# Patient Record
Sex: FEMALE | Race: ASIAN | NOT HISPANIC OR LATINO | URBAN - METROPOLITAN AREA
[De-identification: names, ages, dates, MRNs, and addresses within clinical notes are randomized per-mention and may not be internally consistent; named-entity substitution may affect disease eponyms.]

---

## 2022-09-02 ENCOUNTER — EMERGENCY (EMERGENCY)
Facility: HOSPITAL | Age: 47
LOS: 0 days | Discharge: HOME | End: 2022-09-02
Attending: EMERGENCY MEDICINE
Payer: COMMERCIAL

## 2022-09-02 VITALS
DIASTOLIC BLOOD PRESSURE: 87 MMHG | SYSTOLIC BLOOD PRESSURE: 118 MMHG | HEART RATE: 81 BPM | OXYGEN SATURATION: 100 % | RESPIRATION RATE: 18 BRPM

## 2022-09-02 VITALS — WEIGHT: 125 LBS

## 2022-09-02 DIAGNOSIS — N93.9 ABNORMAL UTERINE AND VAGINAL BLEEDING, UNSPECIFIED: ICD-10-CM

## 2022-09-02 DIAGNOSIS — D25.9 LEIOMYOMA OF UTERUS, UNSPECIFIED: ICD-10-CM

## 2022-09-02 DIAGNOSIS — R42 DIZZINESS AND GIDDINESS: ICD-10-CM

## 2022-09-02 DIAGNOSIS — D64.9 ANEMIA, UNSPECIFIED: ICD-10-CM

## 2022-09-02 LAB
ALBUMIN SERPL ELPH-MCNC: 3.5 G/DL — SIGNIFICANT CHANGE UP (ref 3.3–5)
ALP SERPL-CCNC: 58 U/L — SIGNIFICANT CHANGE UP (ref 40–120)
ALT FLD-CCNC: 20 U/L — SIGNIFICANT CHANGE UP (ref 12–78)
ANION GAP SERPL CALC-SCNC: 4 MMOL/L — LOW (ref 5–17)
APPEARANCE UR: ABNORMAL
AST SERPL-CCNC: 21 U/L — SIGNIFICANT CHANGE UP (ref 15–37)
BASOPHILS # BLD AUTO: 0.07 K/UL — SIGNIFICANT CHANGE UP (ref 0–0.2)
BASOPHILS NFR BLD AUTO: 0.7 % — SIGNIFICANT CHANGE UP (ref 0–2)
BILIRUB SERPL-MCNC: 0.2 MG/DL — SIGNIFICANT CHANGE UP (ref 0.2–1.2)
BILIRUB UR-MCNC: NEGATIVE — SIGNIFICANT CHANGE UP
BUN SERPL-MCNC: 12 MG/DL — SIGNIFICANT CHANGE UP (ref 7–23)
CALCIUM SERPL-MCNC: 8.4 MG/DL — LOW (ref 8.5–10.1)
CHLORIDE SERPL-SCNC: 107 MMOL/L — SIGNIFICANT CHANGE UP (ref 96–108)
CO2 SERPL-SCNC: 26 MMOL/L — SIGNIFICANT CHANGE UP (ref 22–31)
COLOR SPEC: YELLOW — SIGNIFICANT CHANGE UP
CREAT SERPL-MCNC: 0.75 MG/DL — SIGNIFICANT CHANGE UP (ref 0.5–1.3)
DIFF PNL FLD: ABNORMAL
EGFR: 98 ML/MIN/1.73M2 — SIGNIFICANT CHANGE UP
EOSINOPHIL # BLD AUTO: 0.09 K/UL — SIGNIFICANT CHANGE UP (ref 0–0.5)
EOSINOPHIL NFR BLD AUTO: 0.8 % — SIGNIFICANT CHANGE UP (ref 0–6)
GLUCOSE SERPL-MCNC: 126 MG/DL — HIGH (ref 70–99)
GLUCOSE UR QL: NEGATIVE — SIGNIFICANT CHANGE UP
HCT VFR BLD CALC: 27.8 % — LOW (ref 34.5–45)
HCT VFR BLD CALC: 27.9 % — LOW (ref 34.5–45)
HGB BLD-MCNC: 9 G/DL — LOW (ref 11.5–15.5)
HGB BLD-MCNC: 9.1 G/DL — LOW (ref 11.5–15.5)
IMM GRANULOCYTES NFR BLD AUTO: 0.5 % — SIGNIFICANT CHANGE UP (ref 0–1.5)
KETONES UR-MCNC: ABNORMAL
LEUKOCYTE ESTERASE UR-ACNC: ABNORMAL
LYMPHOCYTES # BLD AUTO: 1.33 K/UL — SIGNIFICANT CHANGE UP (ref 1–3.3)
LYMPHOCYTES # BLD AUTO: 12.5 % — LOW (ref 13–44)
MCHC RBC-ENTMCNC: 27.9 PG — SIGNIFICANT CHANGE UP (ref 27–34)
MCHC RBC-ENTMCNC: 28.3 PG — SIGNIFICANT CHANGE UP (ref 27–34)
MCHC RBC-ENTMCNC: 32.4 GM/DL — SIGNIFICANT CHANGE UP (ref 32–36)
MCHC RBC-ENTMCNC: 32.6 GM/DL — SIGNIFICANT CHANGE UP (ref 32–36)
MCV RBC AUTO: 86.1 FL — SIGNIFICANT CHANGE UP (ref 80–100)
MCV RBC AUTO: 86.9 FL — SIGNIFICANT CHANGE UP (ref 80–100)
MONOCYTES # BLD AUTO: 0.46 K/UL — SIGNIFICANT CHANGE UP (ref 0–0.9)
MONOCYTES NFR BLD AUTO: 4.3 % — SIGNIFICANT CHANGE UP (ref 2–14)
NEUTROPHILS # BLD AUTO: 8.63 K/UL — HIGH (ref 1.8–7.4)
NEUTROPHILS NFR BLD AUTO: 81.2 % — HIGH (ref 43–77)
NITRITE UR-MCNC: NEGATIVE — SIGNIFICANT CHANGE UP
PH UR: 5 — SIGNIFICANT CHANGE UP (ref 5–8)
PLATELET # BLD AUTO: 234 K/UL — SIGNIFICANT CHANGE UP (ref 150–400)
PLATELET # BLD AUTO: 239 K/UL — SIGNIFICANT CHANGE UP (ref 150–400)
POTASSIUM SERPL-MCNC: 3.9 MMOL/L — SIGNIFICANT CHANGE UP (ref 3.5–5.3)
POTASSIUM SERPL-SCNC: 3.9 MMOL/L — SIGNIFICANT CHANGE UP (ref 3.5–5.3)
PROT SERPL-MCNC: 6.8 GM/DL — SIGNIFICANT CHANGE UP (ref 6–8.3)
PROT UR-MCNC: 15
RBC # BLD: 3.21 M/UL — LOW (ref 3.8–5.2)
RBC # BLD: 3.23 M/UL — LOW (ref 3.8–5.2)
RBC # FLD: 14.3 % — SIGNIFICANT CHANGE UP (ref 10.3–14.5)
RBC # FLD: 14.3 % — SIGNIFICANT CHANGE UP (ref 10.3–14.5)
SODIUM SERPL-SCNC: 137 MMOL/L — SIGNIFICANT CHANGE UP (ref 135–145)
SP GR SPEC: 1.02 — SIGNIFICANT CHANGE UP (ref 1.01–1.02)
UROBILINOGEN FLD QL: 1
WBC # BLD: 10.47 K/UL — SIGNIFICANT CHANGE UP (ref 3.8–10.5)
WBC # BLD: 10.63 K/UL — HIGH (ref 3.8–10.5)
WBC # FLD AUTO: 10.47 K/UL — SIGNIFICANT CHANGE UP (ref 3.8–10.5)
WBC # FLD AUTO: 10.63 K/UL — HIGH (ref 3.8–10.5)

## 2022-09-02 PROCEDURE — 85027 COMPLETE CBC AUTOMATED: CPT

## 2022-09-02 PROCEDURE — 81001 URINALYSIS AUTO W/SCOPE: CPT

## 2022-09-02 PROCEDURE — 76856 US EXAM PELVIC COMPLETE: CPT

## 2022-09-02 PROCEDURE — 99285 EMERGENCY DEPT VISIT HI MDM: CPT

## 2022-09-02 PROCEDURE — 87086 URINE CULTURE/COLONY COUNT: CPT

## 2022-09-02 PROCEDURE — 76856 US EXAM PELVIC COMPLETE: CPT | Mod: 26

## 2022-09-02 PROCEDURE — 36415 COLL VENOUS BLD VENIPUNCTURE: CPT

## 2022-09-02 PROCEDURE — 85025 COMPLETE CBC W/AUTO DIFF WBC: CPT

## 2022-09-02 PROCEDURE — 99284 EMERGENCY DEPT VISIT MOD MDM: CPT | Mod: 25

## 2022-09-02 PROCEDURE — 80053 COMPREHEN METABOLIC PANEL: CPT

## 2022-09-02 NOTE — ED ADULT TRIAGE NOTE - CHIEF COMPLAINT QUOTE
pt presents to ED due to severe vaginal bleeding x 2 days pt states her period was 2 weeks ago and normal cycle complaints of pain and cramping

## 2022-09-02 NOTE — ED STATDOCS - CARE PLAN
Principal Discharge DX:	Abnormal uterine and vaginal bleeding  Secondary Diagnosis:	Fibroid, uterine  Secondary Diagnosis:	Anemia   1

## 2022-09-02 NOTE — ED STATDOCS - NS ED ROS FT
Constitutional: nad, well appearing, +lightheadedness   HEENT:  no nasal congestion, eye drainage or ear pain.    CVS:  no cp  Resp:  No sob, no cough  GI:  no abdominal pain, no nausea or vomiting  :  no dysuria, +vaginal bleeding   MSK: no joint pain or limited ROM  Skin: no rash  Neuro: no change in mental status or level of consciousness  Heme/lymph: no bleeding

## 2022-09-02 NOTE — ED STATDOCS - PATIENT PORTAL LINK FT
You can access the FollowMyHealth Patient Portal offered by [facility] by registering at the following website: [email or web address]. By joining [facility]’s FollowMyHealth portal, you will also be able to view your health information using other applications (apps) compatible with our system.

## 2022-09-02 NOTE — ED STATDOCS - CLINICAL SUMMARY MEDICAL DECISION MAKING FREE TEXT BOX
Will check H&H, US pelvis and reassess. Will check H&H, US pelvis and reassess.  No significant symptoms of anemia.  Bleeding seems to have improved.  Has some level of baseline anemia and is taking iron.  Pt otherwise well appearing.  Dispo pending labs, imaging, and reassessment.

## 2022-09-02 NOTE — ED STATDOCS - NS ED ATTENDING STATEMENT MOD
This was a shared visit with the NESHA. I reviewed and verified the documentation and independently performed the documented:

## 2022-09-02 NOTE — ED STATDOCS - NSFOLLOWUPINSTRUCTIONS_ED_ALL_ED_FT
Uterine Fibroids       Uterine fibroids, also called leiomyomas, are noncancerous (benign) tumors that can grow in the uterus. They can cause heavy menstrual bleeding and pain. Fibroids may also grow in the fallopian tubes, cervix, or tissues (ligaments) near the uterus.    You may have one or many fibroids. Fibroids vary in size, weight, and where they grow in the uterus. Some can become quite large. Most fibroids do not require medical treatment.      What are the causes?    The cause of this condition is not known.      What increases the risk?    You are more likely to develop this condition if you:  •Are in your 30s or 40s and have not gone through menopause.      •Have a family history of this condition.      •Are of  descent.      •Started your menstrual period at age 10 or younger.      •Have never given birth.      •Are overweight or obese.        What are the signs or symptoms?    Many women do not have any symptoms. Symptoms of this condition may include:  •Heavy menstrual bleeding.      •Bleeding between menstrual periods.      •Pain and pressure in the pelvic area, between your hip bones.      •Pain during sex.      •Bladder problems, such as needing to urinate right away or more often than usual.      •Inability to have children (infertility).      •Failure to carry pregnancy to term (miscarriage).        How is this diagnosed?    This condition may be diagnosed based on:  •Your symptoms and medical history.      •A physical exam.      •A pelvic exam that includes feeling for any tumors.      •Imaging tests, such as ultrasound or MRI.        How is this treated?    Treatment for this condition may include follow-up visits with your health care provider to monitor your fibroids for any changes. Other treatment may include:•Medicines, such as:   •Medicines to relieve pain, including aspirin and NSAIDs, such as ibuprofen or naproxen.      •Hormone therapy. Treatment may be given as a pill or an injection, or it may be inserted into the uterus using an intrauterine device (IUD).      •Surgery that would do one of the following:  •Remove the fibroids (myomectomy). This may be recommended if fibroids affect your fertility and you want to become pregnant.      •Remove the uterus (hysterectomy).      •Block the blood supply to the fibroids (uterine artery embolization). This can cause them to shrink and die.          Follow these instructions at home:    Medicines     •Take over-the-counter and prescription medicines only as told by your health care provider.      •Ask your health care provider if you should take iron pills or eat more iron-rich foods, such as dark green, leafy vegetables. Heavy menstrual bleeding can cause low iron levels.        Managing pain      If directed, apply heat to your back or abdomen to reduce pain. Use the heat source that your health care provider recommends, such as a moist heat pack or a heating pad. To apply heat:  •Place a towel between your skin and the heat source.      •Leave the heat on for 20–30 minutes.      •Remove the heat if your skin turns bright red. This is especially important if you are unable to feel pain, heat, or cold. You may have a greater risk of getting burned.      General instructions   •Pay close attention to your menstrual cycle. Tell your health care provider about any changes, such as:  •Heavier bleeding that requires you to change your pads or tampons more than usual.      •A change in the number of days that your menstrual period lasts.      •A change in symptoms that come with your menstrual period, such as back pain or cramps in your abdomen.        •Keep all follow-up visits. This is important, especially if your fibroids need to be monitored for any changes.        Contact a health care provider if you:    •Have pelvic pain, back pain, or cramps in your abdomen that do not get better with medicine or heat.      •Develop new bleeding between menstrual periods.      •Have increased bleeding during or between menstrual periods.      •Feel more tired or weak than usual.      •Feel light-headed.        Get help right away if you:    •Faint.      •Have pelvic pain that suddenly gets worse.      •Have severe vaginal bleeding that soaks a tampon or pad in 30 minutes or less.        Summary    •Uterine fibroids are noncancerous (benign) tumors that can develop in the uterus.      •The exact cause of this condition is not known.      •Most fibroids do not require medical treatment unless they affect your ability to have children (fertility).      •Contact a health care provider if you have pelvic pain, back pain, or cramps in your abdomen that do not get better with medicines.      •Get help right away if you faint, have pelvic pain that suddenly gets worse, or have severe vaginal bleeding.      This information is not intended to replace advice given to you by your health care provider. Make sure you discuss any questions you have with your health care provider.      Abnormal Uterine Bleeding    A female body showing the reproductive organs, with a close-up view of the uterus and vagina.   Abnormal uterine bleeding is unusual bleeding from the uterus. It includes bleeding after sex, or bleeding or spotting between menstrual periods. It may also include bleeding that is heavier than normal, menstrual periods that last longer than usual, or bleeding that occurs after menopause.    Abnormal uterine bleeding can affect teenagers, women in their reproductive years, pregnant women, and women who have reached menopause. Common causes of abnormal uterine bleeding include:  •Pregnancy.      •Abnormal growths within the lining of the uterus (polyps).      •Benign tumors or growths in the uterus (fibroids). These are not cancer.      •Infection.      •Cancer.      •Too much or too little of some hormones in the body (hormonal imbalances).      Any type of abnormal bleeding should be checked by a health care provider. Many cases are minor and simple to treat, but others may be more serious. Treatment will depend on the cause of the bleeding and how severe it is.      Follow these instructions at home:    Medicines     •Take over-the-counter and prescription medicines only as told by your health care provider.    •Ask your health care provider about:  •Taking medicines such as aspirin and ibuprofen. These medicines can thin your blood. Do not take these medicines unless your health care provider tells you to take them.      •Taking over-the-counter medicines, vitamins, herbs, and supplements.        •If you were prescribed iron pills, take them as told by your health care provider. Iron pills help to replace iron that your body loses because of this condition.      Managing constipation     In cases of severe bleeding, you may be asked to increase your iron intake to treat anemia. Doing this may cause constipation. To prevent or treat constipation, you may need to:  •Drink enough fluid to keep your urine pale yellow.      •Take over-the-counter or prescription medicines.      •Eat foods that are high in fiber, such as beans, whole grains, and fresh fruits and vegetables.      •Limit foods that are high in fat and processed sugars, such as fried or sweet foods.      Activity    Alter your activity to decrease bleeding if you need to change your sanitary pad more than one time every 2 hours:  •Lie in bed with your feet raised (elevated).      •Place a cold pack on your lower abdomen.      •Rest as much as possible until the bleeding stops or slows down.      General instructions    •Do not use tampons, douche, or have sex until your health care provider says these things are okay.      •Change your sanitary pads often.      •Get regular exams. These include pelvic exams and cervical cancer screenings.      •It is up to you to get the results of any tests that are done. Ask your health care provider, or the department that is doing the tests, when your results will be ready.    •Monitor your condition for any changes. For 2 months, write down:  •When your menstrual period starts.      •When your menstrual period ends.      •When any abnormal vaginal bleeding occurs.      •What problems you notice.        •Keep all follow-up visits. This is important.        Contact a health care provider if:    •You have bleeding that lasts for more than one week.      •You feel dizzy at times.      •You feel nauseous or you vomit.      •You feel light-headed or weak.      •You notice any other changes that show that your condition is getting worse.        Get help right away if:    •You faint.      •You have bleeding that soaks through a sanitary pad every hour.      •You have pain in the abdomen.      •You have a fever or chills.      •You become sweaty or weak.      •You pass large blood clots from your vagina.      These symptoms may represent a serious problem that is an emergency. Do not wait to see if the symptoms will go away. Get medical help right away. Call your local emergency services (911 in the [place] Do not drive yourself to the hospital.       Summary    •Abnormal uterine bleeding is unusual bleeding from the uterus.      •Any type of abnormal bleeding should be checked by a health care provider. Many cases are minor and simple to treat, but others may be more serious.      •Treatment will depend on the cause of the bleeding and how severe it is.      •Get help right away if you faint, you have bleeding that soaks through a sanitary pad every hour, or you pass large blood clots from your vagina.      This information is not intended to replace advice given to you by your health care provider. Make sure you discuss any questions you have with your health care provider. an appointment was scheduled for you with your GYN Dr. Dawood Osborne on Tuesday 09/06/22 at 3:45pm please attend this appointment, bring your results from todays visit to your appointment      Uterine Fibroids       Uterine fibroids, also called leiomyomas, are noncancerous (benign) tumors that can grow in the uterus. They can cause heavy menstrual bleeding and pain. Fibroids may also grow in the fallopian tubes, cervix, or tissues (ligaments) near the uterus.    You may have one or many fibroids. Fibroids vary in size, weight, and where they grow in the uterus. Some can become quite large. Most fibroids do not require medical treatment.      What are the causes?    The cause of this condition is not known.      What increases the risk?    You are more likely to develop this condition if you:  •Are in your 30s or 40s and have not gone through menopause.      •Have a family history of this condition.      •Are of  descent.      •Started your menstrual period at age 10 or younger.      •Have never given birth.      •Are overweight or obese.        What are the signs or symptoms?    Many women do not have any symptoms. Symptoms of this condition may include:  •Heavy menstrual bleeding.      •Bleeding between menstrual periods.      •Pain and pressure in the pelvic area, between your hip bones.      •Pain during sex.      •Bladder problems, such as needing to urinate right away or more often than usual.      •Inability to have children (infertility).      •Failure to carry pregnancy to term (miscarriage).        How is this diagnosed?    This condition may be diagnosed based on:  •Your symptoms and medical history.      •A physical exam.      •A pelvic exam that includes feeling for any tumors.      •Imaging tests, such as ultrasound or MRI.        How is this treated?    Treatment for this condition may include follow-up visits with your health care provider to monitor your fibroids for any changes. Other treatment may include:•Medicines, such as:   •Medicines to relieve pain, including aspirin and NSAIDs, such as ibuprofen or naproxen.      •Hormone therapy. Treatment may be given as a pill or an injection, or it may be inserted into the uterus using an intrauterine device (IUD).      •Surgery that would do one of the following:  •Remove the fibroids (myomectomy). This may be recommended if fibroids affect your fertility and you want to become pregnant.      •Remove the uterus (hysterectomy).      •Block the blood supply to the fibroids (uterine artery embolization). This can cause them to shrink and die.          Follow these instructions at home:    Medicines     •Take over-the-counter and prescription medicines only as told by your health care provider.      •Ask your health care provider if you should take iron pills or eat more iron-rich foods, such as dark green, leafy vegetables. Heavy menstrual bleeding can cause low iron levels.        Managing pain      If directed, apply heat to your back or abdomen to reduce pain. Use the heat source that your health care provider recommends, such as a moist heat pack or a heating pad. To apply heat:  •Place a towel between your skin and the heat source.      •Leave the heat on for 20–30 minutes.      •Remove the heat if your skin turns bright red. This is especially important if you are unable to feel pain, heat, or cold. You may have a greater risk of getting burned.      General instructions   •Pay close attention to your menstrual cycle. Tell your health care provider about any changes, such as:  •Heavier bleeding that requires you to change your pads or tampons more than usual.      •A change in the number of days that your menstrual period lasts.      •A change in symptoms that come with your menstrual period, such as back pain or cramps in your abdomen.        •Keep all follow-up visits. This is important, especially if your fibroids need to be monitored for any changes.        Contact a health care provider if you:    •Have pelvic pain, back pain, or cramps in your abdomen that do not get better with medicine or heat.      •Develop new bleeding between menstrual periods.      •Have increased bleeding during or between menstrual periods.      •Feel more tired or weak than usual.      •Feel light-headed.        Get help right away if you:    •Faint.      •Have pelvic pain that suddenly gets worse.      •Have severe vaginal bleeding that soaks a tampon or pad in 30 minutes or less.        Summary    •Uterine fibroids are noncancerous (benign) tumors that can develop in the uterus.      •The exact cause of this condition is not known.      •Most fibroids do not require medical treatment unless they affect your ability to have children (fertility).      •Contact a health care provider if you have pelvic pain, back pain, or cramps in your abdomen that do not get better with medicines.      •Get help right away if you faint, have pelvic pain that suddenly gets worse, or have severe vaginal bleeding.      This information is not intended to replace advice given to you by your health care provider. Make sure you discuss any questions you have with your health care provider.      Abnormal Uterine Bleeding    A female body showing the reproductive organs, with a close-up view of the uterus and vagina.   Abnormal uterine bleeding is unusual bleeding from the uterus. It includes bleeding after sex, or bleeding or spotting between menstrual periods. It may also include bleeding that is heavier than normal, menstrual periods that last longer than usual, or bleeding that occurs after menopause.    Abnormal uterine bleeding can affect teenagers, women in their reproductive years, pregnant women, and women who have reached menopause. Common causes of abnormal uterine bleeding include:  •Pregnancy.      •Abnormal growths within the lining of the uterus (polyps).      •Benign tumors or growths in the uterus (fibroids). These are not cancer.      •Infection.      •Cancer.      •Too much or too little of some hormones in the body (hormonal imbalances).      Any type of abnormal bleeding should be checked by a health care provider. Many cases are minor and simple to treat, but others may be more serious. Treatment will depend on the cause of the bleeding and how severe it is.      Follow these instructions at home:    Medicines     •Take over-the-counter and prescription medicines only as told by your health care provider.    •Ask your health care provider about:  •Taking medicines such as aspirin and ibuprofen. These medicines can thin your blood. Do not take these medicines unless your health care provider tells you to take them.      •Taking over-the-counter medicines, vitamins, herbs, and supplements.        •If you were prescribed iron pills, take them as told by your health care provider. Iron pills help to replace iron that your body loses because of this condition.      Managing constipation     In cases of severe bleeding, you may be asked to increase your iron intake to treat anemia. Doing this may cause constipation. To prevent or treat constipation, you may need to:  •Drink enough fluid to keep your urine pale yellow.      •Take over-the-counter or prescription medicines.      •Eat foods that are high in fiber, such as beans, whole grains, and fresh fruits and vegetables.      •Limit foods that are high in fat and processed sugars, such as fried or sweet foods.      Activity    Alter your activity to decrease bleeding if you need to change your sanitary pad more than one time every 2 hours:  •Lie in bed with your feet raised (elevated).      •Place a cold pack on your lower abdomen.      •Rest as much as possible until the bleeding stops or slows down.      General instructions    •Do not use tampons, douche, or have sex until your health care provider says these things are okay.      •Change your sanitary pads often.      •Get regular exams. These include pelvic exams and cervical cancer screenings.      •It is up to you to get the results of any tests that are done. Ask your health care provider, or the department that is doing the tests, when your results will be ready.    •Monitor your condition for any changes. For 2 months, write down:  •When your menstrual period starts.      •When your menstrual period ends.      •When any abnormal vaginal bleeding occurs.      •What problems you notice.        •Keep all follow-up visits. This is important.        Contact a health care provider if:    •You have bleeding that lasts for more than one week.      •You feel dizzy at times.      •You feel nauseous or you vomit.      •You feel light-headed or weak.      •You notice any other changes that show that your condition is getting worse.        Get help right away if:    •You faint.      •You have bleeding that soaks through a sanitary pad every hour.      •You have pain in the abdomen.      •You have a fever or chills.      •You become sweaty or weak.      •You pass large blood clots from your vagina.      These symptoms may represent a serious problem that is an emergency. Do not wait to see if the symptoms will go away. Get medical help right away. Call your local emergency services (911 in the [place] Do not drive yourself to the hospital.       Summary    •Abnormal uterine bleeding is unusual bleeding from the uterus.      •Any type of abnormal bleeding should be checked by a health care provider. Many cases are minor and simple to treat, but others may be more serious.      •Treatment will depend on the cause of the bleeding and how severe it is.      •Get help right away if you faint, you have bleeding that soaks through a sanitary pad every hour, or you pass large blood clots from your vagina.      This information is not intended to replace advice given to you by your health care provider. Make sure you discuss any questions you have with your health care provider.    Anemia    WHAT YOU NEED TO KNOW:    Anemia is a low number of red blood cells or a low amount of hemoglobin in your red blood cells. Hemoglobin is a protein that helps carry oxygen throughout your body. Red blood cells use iron to create hemoglobin. Anemia may develop if your body does not have enough iron. It may also develop if your body does not make enough red blood cells or they die faster than your body can make them.     DISCHARGE INSTRUCTIONS:    Call 911 or have someone call 911 for any of the following:     You lose consciousness.      You have severe chest pain.    Return to the emergency department if:     You have dark or bloody bowel movements.        Contact your healthcare provider if:     Your symptoms are worse, even after treatment.      You have questions or concerns about your condition or care.    Medicines:     Iron or folic acid supplements help increase your red blood cell and hemoglobin levels.       Vitamin B12 injections may help boost your red blood cell level and decrease your symptoms. Ask your healthcare provider how to inject B12.      Take your medicine as directed. Contact your healthcare provider if you think your medicine is not helping or if you have side effects. Tell him of her if you are allergic to any medicine. Keep a list of the medicines, vitamins, and herbs you take. Include the amounts, and when and why you take them. Bring the list or the pill bottles to follow-up visits. Carry your medicine list with you in case of an emergency.    Prevent anemia: Eat healthy foods rich in iron and vitamin C. Nuts, meat, dark leafy green vegetables, and beans are high in iron and protein. Vitamin C helps your body absorb iron. Foods rich in vitamin C include oranges and other citrus fruits. Ask your healthcare provider for a list of other foods that are high in iron or vitamin C. Ask if you need to be on a special diet.     Follow up with your healthcare provider as directed: Write down your questions so you remember to ask them during your visits.

## 2022-09-02 NOTE — ED STATDOCS - PROGRESS NOTE DETAILS
labs with anemia hgb 9.1, pt states she has a hx of anemia 2/2 heavy periods, states she takes iron supplements, pt also states she has been told she has uterine fibroids in the past.  Pt states she is changing her pad about 8x a day   Edith Cherry PA-C Patient seen and evaluated, ED attending note and orders reviewed, will continue with patient follow up and care -Edith Cherry PA-C labs with anemia hgb 9.1, pt states she has a hx of anemia 2/2 heavy periods, states hgb was 10.9 1 month ago, states she takes iron supplements, pt also states she has been told she has uterine fibroids in the past.  Pt states she is changing her pad about 8x a day   Edith Cherry PA-C US with Heterogeneous, globular, fibroid uterus. Question underlying adenomyosis, will repeat cbc   Edith Cherry PA-C repeat cbc hgb 9.0, spoke with the office of pt's GYN Dr Dawood Osborne as pt states she was unable to get an appt for over a month, was able to obtain an appt for pt on Tuesday 9/6/22 at 3:45pm at their [place] office, pt agreeable to plan, she is feeling well currently, S&S of anemia d/w pt advised to return should be develop any sx, pt agreeable  Edith Cherry PA-C

## 2022-09-02 NOTE — ED STATDOCS - OBJECTIVE STATEMENT
48 yo female w/ no PMHx presents to the ED c/o heavy vaginal bleeding x2days. Pt denies any chance of being pregnant. Over the last 2 years pt has had a hx of heavy vaginal bleeding but over the last two days pt reports having heavier than usual periods. Pt reports being lightheaded and associated cramping around the abd. Otherwise a healthy female with no other symptoms. No other complaints at this time.

## 2022-09-03 LAB
CULTURE RESULTS: NO GROWTH — SIGNIFICANT CHANGE UP
SPECIMEN SOURCE: SIGNIFICANT CHANGE UP

## 2022-09-05 ENCOUNTER — NON-APPOINTMENT (OUTPATIENT)
Age: 47
End: 2022-09-05

## 2022-09-06 ENCOUNTER — NON-APPOINTMENT (OUTPATIENT)
Age: 47
End: 2022-09-06

## 2022-10-18 ENCOUNTER — NON-APPOINTMENT (OUTPATIENT)
Age: 47
End: 2022-10-18

## 2022-12-05 PROBLEM — Z78.9 OTHER SPECIFIED HEALTH STATUS: Chronic | Status: ACTIVE | Noted: 2022-09-02

## 2022-12-14 PROBLEM — Z00.00 ENCOUNTER FOR PREVENTIVE HEALTH EXAMINATION: Status: ACTIVE | Noted: 2022-12-14

## 2022-12-15 ENCOUNTER — NON-APPOINTMENT (OUTPATIENT)
Age: 47
End: 2022-12-15

## 2022-12-15 ENCOUNTER — APPOINTMENT (OUTPATIENT)
Dept: OTOLARYNGOLOGY | Facility: CLINIC | Age: 47
End: 2022-12-15

## 2022-12-15 VITALS — HEIGHT: 58 IN | BODY MASS INDEX: 25.19 KG/M2 | WEIGHT: 120 LBS | TEMPERATURE: 96.7 F

## 2022-12-15 DIAGNOSIS — H61.23 IMPACTED CERUMEN, BILATERAL: ICD-10-CM

## 2022-12-15 PROCEDURE — 69210 REMOVE IMPACTED EAR WAX UNI: CPT

## 2022-12-15 PROCEDURE — 99204 OFFICE O/P NEW MOD 45 MIN: CPT | Mod: 25

## 2022-12-15 RX ORDER — OFLOXACIN OTIC 3 MG/ML
0.3 SOLUTION AURICULAR (OTIC) TWICE DAILY
Qty: 1 | Refills: 1 | Status: ACTIVE | COMMUNITY
Start: 2022-12-15 | End: 1900-01-01

## 2022-12-15 NOTE — REASON FOR VISIT
[Initial Consultation] : an initial consultation for [Ear Pain] : ear pain [FreeTextEntry2] : ear cleaning

## 2022-12-15 NOTE — HISTORY OF PRESENT ILLNESS
[de-identified] : c/o pain in ears - Problem intermittent for 6 mos.  C/o occ clogged ears.  Hx of cerumen in past.  No prior ear problems.

## 2022-12-15 NOTE — PHYSICAL EXAM
[Midline] : trachea located in midline position [Normal] : no rashes [de-identified] : tender tmj bilat  [de-identified] : xs cerumen removed right eac; left OE - some cerumen in ear

## 2022-12-15 NOTE — ASSESSMENT
[FreeTextEntry1] : Patient with intermittent ear pain - worse left - has xs cerumen bilat  - right removed but too painful to remove left side and has some inflammation - recommended dry ear precautions and started on ofloxin drops.\par Also has tender tmj and feel some of issue is tmj - discussed warm compresses and nsaids.  Followup for ear in about 2 weeks.

## 2023-01-12 ENCOUNTER — APPOINTMENT (OUTPATIENT)
Dept: OTOLARYNGOLOGY | Facility: CLINIC | Age: 48
End: 2023-01-12
Payer: COMMERCIAL

## 2023-01-12 VITALS — BODY MASS INDEX: 25.19 KG/M2 | HEIGHT: 58 IN | WEIGHT: 120 LBS | TEMPERATURE: 96.7 F

## 2023-01-12 DIAGNOSIS — H66.92 OTITIS MEDIA, UNSPECIFIED, LEFT EAR: ICD-10-CM

## 2023-01-12 PROCEDURE — 99213 OFFICE O/P EST LOW 20 MIN: CPT | Mod: 25

## 2023-01-12 PROCEDURE — 69210 REMOVE IMPACTED EAR WAX UNI: CPT

## 2023-01-12 RX ORDER — NAPHAZOLINE HCL/PHENIRAMINE .0268-.315
6.5 DROPS OPHTHALMIC (EYE)
Qty: 1 | Refills: 1 | Status: ACTIVE | COMMUNITY
Start: 2023-01-12 | End: 1900-01-01

## 2023-01-12 NOTE — ASSESSMENT
[FreeTextEntry1] : Patient with resolved OE and OM -  residual cerumen removed right ear but still too painful and stuck to canal to complete left ear.  Recommended ear wax drops left ear and followup in 2 weeks.

## 2023-01-12 NOTE — PHYSICAL EXAM
[Midline] : trachea located in midline position [Normal] : no rashes [de-identified] : tender tmj bilat  [de-identified] : right cerumen removed.  Left ear clear but still has hard cerumen in eac -  too painful to remove  [de-identified] : right tm normal ; left  normal but only partly visualized

## 2023-02-07 ENCOUNTER — APPOINTMENT (OUTPATIENT)
Dept: OTOLARYNGOLOGY | Facility: CLINIC | Age: 48
End: 2023-02-07
Payer: COMMERCIAL

## 2023-02-07 VITALS
SYSTOLIC BLOOD PRESSURE: 109 MMHG | TEMPERATURE: 97 F | DIASTOLIC BLOOD PRESSURE: 72 MMHG | BODY MASS INDEX: 25.19 KG/M2 | HEART RATE: 80 BPM | WEIGHT: 120 LBS | HEIGHT: 58 IN

## 2023-02-07 VITALS — BODY MASS INDEX: 25.19 KG/M2 | TEMPERATURE: 97 F | WEIGHT: 120 LBS | HEIGHT: 58 IN

## 2023-02-07 DIAGNOSIS — M26.629 ARTHRALGIA OF TEMPOROMANDIBULAR JOINT,: ICD-10-CM

## 2023-02-07 DIAGNOSIS — H92.02 OTALGIA, LEFT EAR: ICD-10-CM

## 2023-02-07 DIAGNOSIS — H61.22 IMPACTED CERUMEN, LEFT EAR: ICD-10-CM

## 2023-02-07 PROCEDURE — 69210 REMOVE IMPACTED EAR WAX UNI: CPT

## 2023-02-07 PROCEDURE — 99213 OFFICE O/P EST LOW 20 MIN: CPT | Mod: 25

## 2023-02-07 NOTE — PHYSICAL EXAM
[Midline] : trachea located in midline position [Normal] : no rashes [de-identified] : tender tmj bilat  [de-identified] : residual cerumen removed left ear

## 2023-02-07 NOTE — ASSESSMENT
[FreeTextEntry1] : Patient here for followup of left otalgia and residual cerumen left ear.   Ear debrided with curette, alligator and irrigation .  Exam normal after - recommended audio - patient does not want audio.  Recommended return in 6 mos.

## 2023-02-07 NOTE — REASON FOR VISIT
[Subsequent Evaluation] : a subsequent evaluation for [FreeTextEntry2] : ear wax removal / follow up left OE